# Patient Record
Sex: MALE | Race: WHITE
[De-identification: names, ages, dates, MRNs, and addresses within clinical notes are randomized per-mention and may not be internally consistent; named-entity substitution may affect disease eponyms.]

---

## 2019-01-28 ENCOUNTER — HOSPITAL ENCOUNTER (INPATIENT)
Dept: HOSPITAL 54 - ER | Age: 74
LOS: 1 days | Discharge: LEFT BEFORE DISCHARGE | DRG: 280 | End: 2019-01-29
Attending: INTERNAL MEDICINE | Admitting: NURSE PRACTITIONER
Payer: SELF-PAY

## 2019-01-28 VITALS — HEIGHT: 65 IN | WEIGHT: 180 LBS | BODY MASS INDEX: 29.99 KG/M2

## 2019-01-28 VITALS — SYSTOLIC BLOOD PRESSURE: 120 MMHG | DIASTOLIC BLOOD PRESSURE: 70 MMHG

## 2019-01-28 DIAGNOSIS — Z91.19: ICD-10-CM

## 2019-01-28 DIAGNOSIS — E11.65: ICD-10-CM

## 2019-01-28 DIAGNOSIS — I21.4: Primary | ICD-10-CM

## 2019-01-28 DIAGNOSIS — E66.9: ICD-10-CM

## 2019-01-28 DIAGNOSIS — I50.33: ICD-10-CM

## 2019-01-28 DIAGNOSIS — E78.5: ICD-10-CM

## 2019-01-28 LAB
ALBUMIN SERPL BCP-MCNC: 3.6 G/DL (ref 3.4–5)
ALP SERPL-CCNC: 100 U/L (ref 46–116)
ALT SERPL W P-5'-P-CCNC: 30 U/L (ref 12–78)
AST SERPL W P-5'-P-CCNC: 21 U/L (ref 15–37)
BASOPHILS # BLD AUTO: 0 /CMM (ref 0–0.2)
BASOPHILS NFR BLD AUTO: 0.3 % (ref 0–2)
BILIRUB DIRECT SERPL-MCNC: 0.1 MG/DL (ref 0–0.2)
BILIRUB SERPL-MCNC: 0.4 MG/DL (ref 0.2–1)
BUN SERPL-MCNC: 13 MG/DL (ref 7–18)
CALCIUM SERPL-MCNC: 8.9 MG/DL (ref 8.5–10.1)
CHLORIDE SERPL-SCNC: 101 MMOL/L (ref 98–107)
CO2 SERPL-SCNC: 29 MMOL/L (ref 21–32)
CREAT SERPL-MCNC: 1.1 MG/DL (ref 0.6–1.3)
EOSINOPHIL NFR BLD AUTO: 2.3 % (ref 0–6)
GLUCOSE SERPL-MCNC: 184 MG/DL (ref 74–106)
HCT VFR BLD AUTO: 42 % (ref 39–51)
HGB BLD-MCNC: 13.9 G/DL (ref 13.5–17.5)
LYMPHOCYTES NFR BLD AUTO: 1.8 /CMM (ref 0.8–4.8)
LYMPHOCYTES NFR BLD AUTO: 18.6 % (ref 20–44)
MCHC RBC AUTO-ENTMCNC: 33 G/DL (ref 31–36)
MCV RBC AUTO: 96 FL (ref 80–96)
MONOCYTES NFR BLD AUTO: 0.8 /CMM (ref 0.1–1.3)
MONOCYTES NFR BLD AUTO: 8.1 % (ref 2–12)
NEUTROPHILS # BLD AUTO: 6.7 /CMM (ref 1.8–8.9)
NEUTROPHILS NFR BLD AUTO: 70.7 % (ref 43–81)
PLATELET # BLD AUTO: 287 /CMM (ref 150–450)
POTASSIUM SERPL-SCNC: 3.8 MMOL/L (ref 3.5–5.1)
PROT SERPL-MCNC: 8.2 G/DL (ref 6.4–8.2)
RBC # BLD AUTO: 4.34 MIL/UL (ref 4.5–6)
SODIUM SERPL-SCNC: 138 MMOL/L (ref 136–145)
WBC NRBC COR # BLD AUTO: 9.5 K/UL (ref 4.3–11)

## 2019-01-28 PROCEDURE — G0378 HOSPITAL OBSERVATION PER HR: HCPCS

## 2019-01-28 NOTE — NUR
TELE RN NOTE:



RECEIVED PATIENT FROM ER, NO ACUTE DISTRESS NOTED, FAMILY AT BEDSIDE. BREATHING EVEN AND 
UNLABORED, NO SOB NOTED. PATIENT DENIES CHEST PAIN AT THIS TIME. IV TO RAC IN PLACE. 
ORIENTED PATIENT TO ROOM AND USE OF CALL LIGHT. WAITING FOR ADMIT ORDERS. BED LOCKED AND IN 
LOWEST POSITION, CALL LIGHT IN REACH. WILL CONTINUE TO MONITOR.

## 2019-01-29 VITALS — DIASTOLIC BLOOD PRESSURE: 54 MMHG | SYSTOLIC BLOOD PRESSURE: 104 MMHG

## 2019-01-29 VITALS — SYSTOLIC BLOOD PRESSURE: 99 MMHG | DIASTOLIC BLOOD PRESSURE: 62 MMHG

## 2019-01-29 VITALS — DIASTOLIC BLOOD PRESSURE: 62 MMHG | SYSTOLIC BLOOD PRESSURE: 107 MMHG

## 2019-01-29 LAB
BASOPHILS # BLD AUTO: 0 /CMM (ref 0–0.2)
BASOPHILS NFR BLD AUTO: 0.4 % (ref 0–2)
BUN SERPL-MCNC: 13 MG/DL (ref 7–18)
CALCIUM SERPL-MCNC: 8.5 MG/DL (ref 8.5–10.1)
CHLORIDE SERPL-SCNC: 106 MMOL/L (ref 98–107)
CHOLEST SERPL-MCNC: 165 MG/DL (ref ?–200)
CO2 SERPL-SCNC: 30 MMOL/L (ref 21–32)
CREAT SERPL-MCNC: 1 MG/DL (ref 0.6–1.3)
EOSINOPHIL NFR BLD AUTO: 5.1 % (ref 0–6)
GLUCOSE SERPL-MCNC: 174 MG/DL (ref 74–106)
HCT VFR BLD AUTO: 37 % (ref 39–51)
HDLC SERPL-MCNC: 37 MG/DL (ref 40–60)
HGB BLD-MCNC: 12.5 G/DL (ref 13.5–17.5)
LDLC SERPL DIRECT ASSAY-MCNC: 120 MG/DL (ref 0–99)
LYMPHOCYTES NFR BLD AUTO: 1.4 /CMM (ref 0.8–4.8)
LYMPHOCYTES NFR BLD AUTO: 15.2 % (ref 20–44)
MAGNESIUM SERPL-MCNC: 2.2 MG/DL (ref 1.8–2.4)
MCHC RBC AUTO-ENTMCNC: 33 G/DL (ref 31–36)
MCV RBC AUTO: 96 FL (ref 80–96)
MONOCYTES NFR BLD AUTO: 0.7 /CMM (ref 0.1–1.3)
MONOCYTES NFR BLD AUTO: 7.7 % (ref 2–12)
NEUTROPHILS # BLD AUTO: 6.8 /CMM (ref 1.8–8.9)
NEUTROPHILS NFR BLD AUTO: 71.6 % (ref 43–81)
PHOSPHATE SERPL-MCNC: 2.7 MG/DL (ref 2.5–4.9)
PLATELET # BLD AUTO: 242 /CMM (ref 150–450)
POTASSIUM SERPL-SCNC: 4 MMOL/L (ref 3.5–5.1)
RBC # BLD AUTO: 3.9 MIL/UL (ref 4.5–6)
SODIUM SERPL-SCNC: 143 MMOL/L (ref 136–145)
TRIGL SERPL-MCNC: 95 MG/DL (ref 30–150)
WBC NRBC COR # BLD AUTO: 9.5 K/UL (ref 4.3–11)

## 2019-01-29 NOTE — NUR
TELE RN NOTES:



BLOOD SUGAR THIS AM . NO INSULIN WAS ADMINISTERED AS BREAKFAST TRAY WILL BE HELD TO 
BE SEEN BY CARDIO. WILL ENDORSE TO AM NURSE.

## 2019-01-29 NOTE — NUR
m/s lvn: notes



Dr. Loredo spoke to pt prior to leaving the hospital and instructed pt to follow up with his 
primary care physician today and pt verbalized understanding with [facility] staff translating

## 2019-01-29 NOTE — NUR
TELE RN CLOSING NOTES:



ALL NEEDS WERE ATTENDED AND ANTICIPATED FOR. PT ASLEEP AT THIS TIME. NO SOB NOTED. NO S/S OF 
DISTRESS. PT ON TELE MONITOR AND READING SHOWS SR 60S AT THIS TIME. IV REMAINS INTACT. 
CURRENTLY H/L. BED KEPT IN LOW, LOCKED POSITION, AND SIDE RAILS X 2UP. BLOOD SUGAR THIS AM 
. HELD INSULIN AS BREAKFAST TRAY WILL BE HELD AND ACCUCHEKS ARE Q4HR. WILL ENDORSE TO 
AM NURSE FOR VINCE.

## 2019-01-29 NOTE — NUR
TELE RN OPENING NOTES:



RECEIVED PT ON ROOM AIR AND IS TOLERATING WELL. WIFE AT BEDSIDE AT THIS TIME. NO S/S OF 
DISTRESS. PT RESTING COMFORTABLY AND IS ASLEEP. PT HAS IV ON R AC#20G AND IS PATENT AND 
INTACT. CURRENTLY H/L. PT ON TELE MONITOR AND READING SHOWS SB 59 AT THIS TIME. BED ALARM 
ACTIVATED. PT Latvian SPEAKING ONLY. BED KEPT IN LOW, LOCKED POSITION, AND SIDE RAILS X 2UP. 
WILL CONTINUE TO MONITOR PT.

## 2019-01-29 NOTE — NUR
m/s lvn: discharged ama



discharged home against medical advice via private car accompanied by daughter with 
belongings.

## 2019-01-29 NOTE — NUR
m/s lvn:  notes



discharge ama including risks and consequences instructions given to pt and verbalized 
understanding. will continue to monitor.

## 2019-01-29 NOTE — NUR
TELE RN NOTE:



REPORT GIVEN TO MARIE, PATIENT RESTING IN BED, NO ACUTE DISTRESS NOTED. PATIENT TROP LEVEL 
ELEVATED AT 1.49, ON CALL MIRI [place], INFORMED. PATIENT ALREADY RECEIVED A DOSE OF LOVENOX 
IN ER.  WILL CONTINUE TO MONITOR.

## 2019-01-29 NOTE — NUR
tele lvn: notes



pt is wants to go voluntarily leaving the hospital without being seen by a physician. 
educated pt on risks and consequences involving in leaving hospital at this time. instructed 
pt to follow up with primary care physician as soon as possible, pt verbalized 
understanding. pt called wife to pick him up. tele removed. will continue to monitor. dr. sneed (hospitalist) and dr. garcia (cardio) here and made aware. will continue to monitor.